# Patient Record
Sex: FEMALE
[De-identification: names, ages, dates, MRNs, and addresses within clinical notes are randomized per-mention and may not be internally consistent; named-entity substitution may affect disease eponyms.]

---

## 2020-07-22 ENCOUNTER — HOSPITAL ENCOUNTER (EMERGENCY)
Dept: HOSPITAL 62 - ER | Age: 30
LOS: 1 days | Discharge: LEFT BEFORE DISCHARGE | End: 2020-07-23
Payer: SELF-PAY

## 2020-07-22 VITALS — SYSTOLIC BLOOD PRESSURE: 121 MMHG | DIASTOLIC BLOOD PRESSURE: 79 MMHG

## 2020-07-22 DIAGNOSIS — S06.9X9A: Primary | ICD-10-CM

## 2020-07-22 DIAGNOSIS — G47.00: ICD-10-CM

## 2020-07-22 DIAGNOSIS — Z88.1: ICD-10-CM

## 2020-07-22 DIAGNOSIS — Z53.20: ICD-10-CM
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
## 2020-07-22 PROCEDURE — 70450 CT HEAD/BRAIN W/O DYE: CPT

## 2020-07-22 PROCEDURE — 99281 EMR DPT VST MAYX REQ PHY/QHP: CPT

## 2020-07-22 NOTE — ER DOCUMENT REPORT
ED Medical Screen (RME)





- General


Chief Complaint: Head Injury


Stated Complaint: HEAD INJURY


Time Seen by Provider: 07/22/20 21:07


Notes: 





Patient states that last week she slipped on wet pavement fell and hit her head.

 Patient states there was positive loss of consciousness.  Patient states that 

she initially had nausea although this is improved.  Patient reports problems 

with insomnia, feeling off balance, having memory problems, and insomnia.





I have greeted and performed a rapid initial assessment of this patient.  A 

comprehensive ED assessment and evaluation of the patient, analysis of test 

results and completion of the medical decision making process will be conducted 

by additional ED providers.





- Related Data


Allergies/Adverse Reactions: 


                                        





ANTIBIOCTICS Allergy (Uncoded 07/22/20 21:09)


   








Home Medications: ATIVAN PRN





Past Medical History





- Social History


Frequency of alcohol use: Occasional


Drug Abuse: None





Physical Exam





- Vital signs


Vitals: 





                                        











Temp Pulse Resp BP Pulse Ox


 


 98.9 F   77   24 H  121/79   100 


 


 07/22/20 20:36  07/22/20 20:36  07/22/20 20:36  07/22/20 20:36  07/22/20 20:36














- Neurological


Neuro grossly intact: Yes


[place] Coma Scale Eye Opening: Spontaneous


[place] Coma Scale Verbal: Oriented


[place] Coma Scale Motor: Obeys Commands


[place] Coma Scale Total: 15





Course





- Vital Signs


Vital signs: 





                                        











Temp Pulse Resp BP Pulse Ox


 


 98.9 F   77   24 H  121/79   100 


 


 07/22/20 20:36  07/22/20 20:36  07/22/20 20:36  07/22/20 20:36  07/22/20 20:36

## 2020-07-22 NOTE — RADIOLOGY REPORT (SQ)
CT head without contrast on  7/22/2020 at 9:27 PM



CLINICAL INDICATION: Head injury, persistent headache, loss of

consciousness



TECHNIQUE: Multiple axial images are obtained throughout the head

without the administration of contrast. This exam was performed

according to our departmental dose-optimization program, which

includes automated exposure control, adjustment of the mA and/or

kV according to patient size and/or use of iterative

reconstruction technique. 

Total DLP is 963.96 mGy*cm.



COMPARISON: None



FINDINGS:   There is no hydrocephalus. There is no CT evidence of

acute infarct. There is no hemorrhage. There are no abnormal

extra-axial fluid collections. There is no mass, mass effect or

midline shift. No bony abnormality is noted.



IMPRESSION: No acute intracranial abnormality.